# Patient Record
Sex: MALE | HISPANIC OR LATINO | URBAN - NONMETROPOLITAN AREA
[De-identification: names, ages, dates, MRNs, and addresses within clinical notes are randomized per-mention and may not be internally consistent; named-entity substitution may affect disease eponyms.]

---

## 2019-01-03 ENCOUNTER — OFFICE VISIT (OUTPATIENT)
Dept: URBAN - NONMETROPOLITAN AREA CLINIC 18 | Facility: CLINIC | Age: 64
End: 2019-01-03
Payer: MEDICARE

## 2019-01-03 DIAGNOSIS — H11.042 PERIPHERAL PTERYGIUM, STATIONARY, LEFT EYE: ICD-10-CM

## 2019-01-03 DIAGNOSIS — H52.4 PRESBYOPIA: ICD-10-CM

## 2019-01-03 DIAGNOSIS — H25.813 COMBINED FORMS OF AGE-RELATED CATARACT, BILATERAL: Primary | ICD-10-CM

## 2019-01-03 PROCEDURE — 92014 COMPRE OPH EXAM EST PT 1/>: CPT | Performed by: OPHTHALMOLOGY

## 2019-01-03 ASSESSMENT — VISUAL ACUITY
OD: 20/20
OS: 20/30

## 2019-01-03 ASSESSMENT — INTRAOCULAR PRESSURE
OD: 14
OS: 13

## 2019-01-03 NOTE — IMPRESSION/PLAN
Impression: Combined forms of age-related cataract, bilateral: H25.813. Plan: Cataracts account for the patient's complaints. No treatment currently recommended. The patient will monitor vision changes and contact us with any decrease in vision. New glasses Rx given today.

## 2019-01-03 NOTE — IMPRESSION/PLAN
Impression: Peripheral pterygium, stationary, left eye: H11.042. Plan: Pt elects to wait for surgery until symtpoms worse. Will observe in 1 year. ART tears QID.

## 2021-11-29 ENCOUNTER — OFFICE VISIT (OUTPATIENT)
Dept: URBAN - NONMETROPOLITAN AREA CLINIC 18 | Facility: CLINIC | Age: 66
End: 2021-11-29
Payer: MEDICARE

## 2021-11-29 DIAGNOSIS — S05.01XA CORNEAL ABRASION W/O FB OF RIGHT EYE, INITIAL ENCOUNTER: ICD-10-CM

## 2021-11-29 DIAGNOSIS — H11.043 PERIPHERAL PTERYGIUM, STATIONARY, BILATERAL: ICD-10-CM

## 2021-11-29 DIAGNOSIS — T15.11XA FOREIGN BODY IN CONJUNCTIVAL SAC OF RIGHT EYE, INITIAL ENCOUNTER: Primary | ICD-10-CM

## 2021-11-29 PROCEDURE — 99204 OFFICE O/P NEW MOD 45 MIN: CPT | Performed by: OPTOMETRIST

## 2021-11-29 PROCEDURE — 65205 REMOVE FOREIGN BODY FROM EYE: CPT | Performed by: OPTOMETRIST

## 2021-11-29 RX ORDER — OFLOXACIN 3 MG/ML
0.3 % SOLUTION/ DROPS OPHTHALMIC
Qty: 5 | Refills: 0 | Status: ACTIVE
Start: 2021-11-29

## 2021-11-29 ASSESSMENT — INTRAOCULAR PRESSURE: OS: 13

## 2021-11-29 NOTE — IMPRESSION/PLAN
Impression: Foreign body in conjunctival sac of right eye, initial encounter: T15.11XA. Plan: Removal of FB with cotton swab with consent and without incident. Will start Ofloxacin Q2H OD, rx sent to pharmacy. RTC if pain, redness or changes in vision experienced. RTC in 2-5 days for follow up.

## 2021-12-01 ENCOUNTER — OFFICE VISIT (OUTPATIENT)
Dept: URBAN - NONMETROPOLITAN AREA CLINIC 18 | Facility: CLINIC | Age: 66
End: 2021-12-01
Payer: MEDICARE

## 2021-12-01 DIAGNOSIS — T15.01XS FOREIGN BODY IN CORNEA, RIGHT EYE, SEQUELA: Primary | ICD-10-CM

## 2021-12-01 PROCEDURE — 99213 OFFICE O/P EST LOW 20 MIN: CPT | Performed by: OPTOMETRIST

## 2021-12-01 NOTE — IMPRESSION/PLAN
Impression: Foreign body in cornea, right eye, sequela: T15.01XS. Plan: No FB observed today. Continue Ofloxacin OS as previously instructed. RTC if pain, redness or changes in vision experienced.

## 2023-01-31 ENCOUNTER — OFFICE VISIT (OUTPATIENT)
Dept: URBAN - NONMETROPOLITAN AREA CLINIC 18 | Facility: CLINIC | Age: 68
End: 2023-01-31
Payer: MEDICARE

## 2023-01-31 DIAGNOSIS — H52.03 HYPERMETROPIA, BILATERAL: ICD-10-CM

## 2023-01-31 DIAGNOSIS — H17.89 OTHER CORNEAL SCAR: ICD-10-CM

## 2023-01-31 DIAGNOSIS — H11.043 PERIPHERAL PTERYGIUM, STATIONARY, BILATERAL: ICD-10-CM

## 2023-01-31 DIAGNOSIS — H25.813 COMBINED FORMS OF AGE-RELATED CATARACT, BILATERAL: Primary | ICD-10-CM

## 2023-01-31 PROCEDURE — 99213 OFFICE O/P EST LOW 20 MIN: CPT | Performed by: OPTOMETRIST

## 2023-01-31 ASSESSMENT — INTRAOCULAR PRESSURE
OD: 13
OS: 13

## 2023-01-31 ASSESSMENT — VISUAL ACUITY
OS: 20/30
OD: 20/30